# Patient Record
Sex: FEMALE | Race: WHITE | NOT HISPANIC OR LATINO | RURAL
[De-identification: names, ages, dates, MRNs, and addresses within clinical notes are randomized per-mention and may not be internally consistent; named-entity substitution may affect disease eponyms.]

---

## 2019-04-16 ENCOUNTER — OFFICE VISIT (OUTPATIENT)
Dept: RETAIL CLINIC | Facility: CLINIC | Age: 34
End: 2019-04-16

## 2019-04-16 VITALS
SYSTOLIC BLOOD PRESSURE: 134 MMHG | WEIGHT: 134.2 LBS | BODY MASS INDEX: 22.91 KG/M2 | HEART RATE: 111 BPM | HEIGHT: 64 IN | OXYGEN SATURATION: 98 % | DIASTOLIC BLOOD PRESSURE: 84 MMHG | RESPIRATION RATE: 14 BRPM | TEMPERATURE: 97.2 F

## 2019-04-16 DIAGNOSIS — R68.89 FLU-LIKE SYMPTOMS: ICD-10-CM

## 2019-04-16 DIAGNOSIS — J01.40 ACUTE NON-RECURRENT PANSINUSITIS: Primary | ICD-10-CM

## 2019-04-16 LAB
EXPIRATION DATE: NORMAL
FLUAV AG NPH QL: NEGATIVE
FLUBV AG NPH QL: NEGATIVE
INTERNAL CONTROL: NORMAL
Lab: NORMAL

## 2019-04-16 PROCEDURE — 87804 INFLUENZA ASSAY W/OPTIC: CPT | Performed by: NURSE PRACTITIONER

## 2019-04-16 PROCEDURE — 99213 OFFICE O/P EST LOW 20 MIN: CPT | Performed by: NURSE PRACTITIONER

## 2019-04-16 RX ORDER — BENZONATATE 200 MG/1
200 CAPSULE ORAL 3 TIMES DAILY PRN
Qty: 30 CAPSULE | Refills: 0 | Status: SHIPPED | OUTPATIENT
Start: 2019-04-16 | End: 2019-06-15

## 2019-04-16 RX ORDER — PREDNISONE 10 MG/1
TABLET ORAL DAILY
Qty: 21 EACH | Refills: 0 | Status: SHIPPED | OUTPATIENT
Start: 2019-04-16 | End: 2019-04-22

## 2019-04-16 RX ORDER — FLUTICASONE PROPIONATE 50 MCG
2 SPRAY, SUSPENSION (ML) NASAL NIGHTLY
Qty: 1 BOTTLE | Refills: 0 | Status: SHIPPED | OUTPATIENT
Start: 2019-04-16 | End: 2019-05-16

## 2019-04-16 RX ORDER — PSEUDOEPHEDRINE HCL 120 MG/1
120 TABLET, FILM COATED, EXTENDED RELEASE ORAL EVERY 12 HOURS
Qty: 20 TABLET | Refills: 0 | Status: SHIPPED | OUTPATIENT
Start: 2019-04-16 | End: 2019-04-26

## 2019-04-16 RX ORDER — AZITHROMYCIN 250 MG/1
TABLET, FILM COATED ORAL
Qty: 6 TABLET | Refills: 0 | Status: SHIPPED | OUTPATIENT
Start: 2019-04-16 | End: 2019-06-15

## 2019-04-16 NOTE — PROGRESS NOTES
Subjective   Renay Haney is a 34 y.o. female.     Pt states that she feels SOA, nasal congestion, cough, sore throat and body aches for the last two days. PT states that she had similar symptoms one month ago      Cough   This is a new problem. The current episode started in the past 7 days (over 48 hours). The problem has been gradually worsening. The problem occurs constantly. The cough is non-productive. Associated symptoms include chills, headaches, myalgias, nasal congestion, postnasal drip, rhinorrhea, a sore throat, shortness of breath and sweats. Pertinent negatives include no chest pain, ear congestion, ear pain, eye redness, fever, heartburn, hemoptysis, rash, weight loss or wheezing. Nothing aggravates the symptoms. Treatments tried: OTC mucinex. The treatment provided no relief. Her past medical history is significant for environmental allergies. There is no history of asthma, bronchiectasis, bronchitis, COPD, emphysema or pneumonia.   Flu Symptoms   This is a new problem. The current episode started in the past 7 days. The problem occurs constantly. The problem has been gradually worsening. Associated symptoms include chills, congestion, coughing, diaphoresis, fatigue, headaches, myalgias, nausea and a sore throat. Pertinent negatives include no abdominal pain, anorexia, arthralgias, change in bowel habit, chest pain, fever, joint swelling, neck pain, numbness, rash, swollen glands, urinary symptoms, vertigo, visual change, vomiting or weakness. Nothing aggravates the symptoms. She has tried acetaminophen and NSAIDs for the symptoms. The treatment provided no relief.        No current outpatient medications on file prior to visit.     No current facility-administered medications on file prior to visit.        No Known Allergies    Past Medical History:   Diagnosis Date   • Acid reflux    • Allergic    • Anxiety    • Depression    • Migraines    • Stomach ulcer        Past Surgical History:   Procedure  "Laterality Date   • ADENOIDECTOMY     • TONSILLECTOMY         Family History   Problem Relation Age of Onset   • Diabetes Father        Social History     Socioeconomic History   • Marital status: Unknown     Spouse name: Not on file   • Number of children: Not on file   • Years of education: Not on file   • Highest education level: Not on file   Tobacco Use   • Smoking status: Current Every Day Smoker     Packs/day: 0.50     Years: 15.00     Pack years: 7.50   Substance and Sexual Activity   • Alcohol use: No     Frequency: Never   • Drug use: No       Review of Systems   Constitutional: Positive for appetite change, chills, diaphoresis and fatigue. Negative for activity change, fever and weight loss.   HENT: Positive for congestion, postnasal drip, rhinorrhea, sneezing and sore throat. Negative for ear pain, sinus pressure, trouble swallowing and voice change.    Eyes: Positive for discharge. Negative for pain, redness and itching.   Respiratory: Positive for cough and shortness of breath. Negative for hemoptysis and wheezing.    Cardiovascular: Negative for chest pain.   Gastrointestinal: Positive for nausea. Negative for abdominal pain, anorexia, change in bowel habit, diarrhea, heartburn and vomiting.   Musculoskeletal: Positive for myalgias. Negative for arthralgias, joint swelling and neck pain.   Skin: Negative for rash.   Allergic/Immunologic: Positive for environmental allergies.   Neurological: Positive for headaches. Negative for dizziness, vertigo, weakness, light-headedness and numbness.   Psychiatric/Behavioral: Negative for agitation.       /84   Pulse 111   Temp 97.2 °F (36.2 °C) (Oral)   Resp 14   Ht 162.6 cm (64\")   Wt 60.9 kg (134 lb 3.2 oz)   LMP 04/11/2019   SpO2 98%   BMI 23.04 kg/m²     Objective   Physical Exam   Constitutional: She is oriented to person, place, and time. She appears well-developed and well-nourished. She is cooperative. She appears ill.   HENT:   Head: " Normocephalic and atraumatic.   Right Ear: Tympanic membrane, external ear and ear canal normal.   Left Ear: Tympanic membrane, external ear and ear canal normal. There is tenderness.   Nose: Mucosal edema and rhinorrhea present. Right sinus exhibits maxillary sinus tenderness and frontal sinus tenderness. Left sinus exhibits maxillary sinus tenderness and frontal sinus tenderness.   Mouth/Throat: Uvula is midline and mucous membranes are normal. Oropharyngeal exudate (PND) present. No posterior oropharyngeal erythema. Tonsils are 0 on the right. Tonsils are 0 on the left. No tonsillar exudate.   Eyes: Conjunctivae and lids are normal.   Cardiovascular: Normal heart sounds.   Pulmonary/Chest: Effort normal and breath sounds normal.   Abdominal: Soft. Bowel sounds are increased. There is no tenderness.   Lymphadenopathy:     She has cervical adenopathy.   Neurological: She is alert and oriented to person, place, and time.   Skin: Skin is warm and dry. No rash noted.   Psychiatric: She has a normal mood and affect. Her speech is normal and behavior is normal.       Procedures None    Assessment/Plan   Renay was seen today for cough, sore throat and flu symptoms.    Diagnoses and all orders for this visit:    Acute non-recurrent pansinusitis    Flu-like symptoms  -     POC Influenza A / B    Other orders  -     azithromycin (ZITHROMAX Z-KHALIF) 250 MG tablet; Take 2 tablets the first day, then 1 tablet daily for 4 days.  -     benzonatate (TESSALON) 200 MG capsule; Take 1 capsule by mouth 3 (Three) Times a Day As Needed for Cough.  -     pseudoephedrine (SUDAFED) 120 MG 12 hr tablet; Take 1 tablet by mouth Every 12 (Twelve) Hours for 10 days.  -     fluticasone (FLONASE) 50 MCG/ACT nasal spray; 2 sprays into the nostril(s) as directed by provider Every Night for 30 days. Administer 2 sprays in each nostril for each dose.  -     predniSONE (DELTASONE) 10 MG (21) tablet pack; Take  by mouth Daily for 6 days. Use as  directed on package        Results for orders placed or performed in visit on 04/16/19   POC Influenza A / B   Result Value Ref Range    Rapid Influenza A Ag Negative Negative    Rapid Influenza B Ag Negative Negative    Internal Control Passed Passed    Lot Number [id number]     Expiration Date 4,302,021      -[facility] 4/12/19  Follow up with PCP or go to the nearest emergency room if symptoms worsen or fail to improve.

## 2019-04-16 NOTE — PATIENT INSTRUCTIONS

## 2019-05-02 ENCOUNTER — LAB REQUISITION (OUTPATIENT)
Dept: LAB | Facility: HOSPITAL | Age: 34
End: 2019-05-02

## 2019-05-02 DIAGNOSIS — Z00.00 ROUTINE GENERAL MEDICAL EXAMINATION AT A HEALTH CARE FACILITY: ICD-10-CM

## 2019-05-02 PROCEDURE — 36415 COLL VENOUS BLD VENIPUNCTURE: CPT | Performed by: OBSTETRICS & GYNECOLOGY

## 2019-06-15 ENCOUNTER — OFFICE VISIT (OUTPATIENT)
Dept: RETAIL CLINIC | Facility: CLINIC | Age: 34
End: 2019-06-15

## 2019-06-15 VITALS
BODY MASS INDEX: 21.61 KG/M2 | OXYGEN SATURATION: 99 % | HEIGHT: 64 IN | RESPIRATION RATE: 12 BRPM | TEMPERATURE: 97.4 F | WEIGHT: 126.6 LBS | HEART RATE: 90 BPM | SYSTOLIC BLOOD PRESSURE: 118 MMHG | DIASTOLIC BLOOD PRESSURE: 70 MMHG

## 2019-06-15 DIAGNOSIS — J01.41 ACUTE RECURRENT PANSINUSITIS: Primary | ICD-10-CM

## 2019-06-15 DIAGNOSIS — R05.9 COUGHING: ICD-10-CM

## 2019-06-15 PROCEDURE — 99213 OFFICE O/P EST LOW 20 MIN: CPT | Performed by: NURSE PRACTITIONER

## 2019-06-15 RX ORDER — LORATADINE 10 MG/1
10 TABLET ORAL DAILY
COMMUNITY

## 2019-06-15 RX ORDER — AMOXICILLIN AND CLAVULANATE POTASSIUM 875; 125 MG/1; MG/1
1 TABLET, FILM COATED ORAL 2 TIMES DAILY
Qty: 20 TABLET | Refills: 0 | Status: SHIPPED | OUTPATIENT
Start: 2019-06-15 | End: 2019-08-27

## 2019-06-15 RX ORDER — DEXTROMETHORPHAN HYDROBROMIDE AND PROMETHAZINE HYDROCHLORIDE 15; 6.25 MG/5ML; MG/5ML
5 SYRUP ORAL 4 TIMES DAILY PRN
Qty: 240 ML | Refills: 0 | Status: SHIPPED | OUTPATIENT
Start: 2019-06-15 | End: 2019-06-25

## 2019-06-15 RX ORDER — PSEUDOEPHEDRINE HCL 120 MG/1
120 TABLET, FILM COATED, EXTENDED RELEASE ORAL EVERY 12 HOURS
Qty: 20 TABLET | Refills: 0 | Status: SHIPPED | OUTPATIENT
Start: 2019-06-15 | End: 2019-06-25

## 2019-06-15 RX ORDER — PREDNISONE 10 MG/1
TABLET ORAL DAILY
Qty: 21 EACH | Refills: 0 | Status: SHIPPED | OUTPATIENT
Start: 2019-06-15 | End: 2019-06-21

## 2019-06-15 NOTE — PROGRESS NOTES
Subjective   Renay Haney is a 34 y.o. female.     Sinus Problem   This is a recurrent problem. Episode onset: 2-3 weeks. The problem has been gradually worsening since onset. There has been no fever. The pain is severe. Associated symptoms include congestion, coughing (severe, persistent), headaches, a hoarse voice, sinus pressure and swollen glands. Pertinent negatives include no chills, ear pain, neck pain, shortness of breath, sneezing or sore throat. Treatments tried: allergy medication. The treatment provided no relief.   Cough   This is a recurrent problem. The current episode started in the past 7 days. The problem has been gradually worsening. The problem occurs every few minutes. The cough is non-productive. Associated symptoms include ear congestion, headaches, nasal congestion, postnasal drip and rhinorrhea. Pertinent negatives include no chest pain, chills, ear pain, fever, myalgias, rash, sore throat, shortness of breath, weight loss or wheezing. Risk factors for lung disease include smoking/tobacco exposure. She has tried OTC cough suppressant for the symptoms. The treatment provided no relief. Her past medical history is significant for bronchitis. There is no history of asthma or pneumonia.        The following portions of the patient's history were reviewed and updated as appropriate: allergies, current medications, past medical history, past social history, past surgical history and problem list.    Review of Systems   Constitutional: Negative for appetite change, chills, fever and weight loss.   HENT: Positive for congestion, hoarse voice, postnasal drip, rhinorrhea, sinus pressure and sinus pain (severe). Negative for ear pain, sneezing, sore throat and trouble swallowing.    Eyes: Negative.    Respiratory: Positive for cough (severe, persistent) and chest tightness. Negative for shortness of breath and wheezing.    Cardiovascular: Negative.  Negative for chest pain.   Gastrointestinal: Negative  "for abdominal pain, diarrhea, nausea and vomiting.   Musculoskeletal: Negative.  Negative for myalgias and neck pain.   Skin: Negative.  Negative for rash.   Neurological: Positive for headaches. Negative for dizziness.   Hematological: Positive for adenopathy.        /70   Pulse 90   Temp 97.4 °F (36.3 °C)   Resp 12   Ht 162.6 cm (64\")   Wt 57.4 kg (126 lb 9.6 oz)   LMP 05/25/2019   SpO2 99%   BMI 21.73 kg/m²      Objective   Physical Exam   Constitutional: She is oriented to person, place, and time. Vital signs are normal. She appears well-developed and well-nourished. No distress.   HENT:   Head: Normocephalic.   Right Ear: External ear and ear canal normal. No drainage, swelling or tenderness. Tympanic membrane is bulging. Tympanic membrane is not erythematous.   Left Ear: External ear and ear canal normal. No drainage, swelling or tenderness. Tympanic membrane is bulging. Tympanic membrane is not erythematous.   Nose: Mucosal edema and rhinorrhea (thick, yellowish) present. Right sinus exhibits maxillary sinus tenderness (severe) and frontal sinus tenderness. Left sinus exhibits maxillary sinus tenderness (severe) and frontal sinus tenderness.   Mouth/Throat: Uvula is midline, oropharynx is clear and moist and mucous membranes are normal. Tonsils are 0 on the right. Tonsils are 0 on the left. No tonsillar exudate.   Eyes: Conjunctivae are normal. Pupils are equal, round, and reactive to light.   Neck: Normal range of motion. Neck supple.   Cardiovascular: Normal rate, regular rhythm, S1 normal, S2 normal and normal heart sounds.   Pulmonary/Chest: Effort normal and breath sounds normal. No stridor. No respiratory distress. She has no decreased breath sounds. She has no wheezes. She has no rhonchi. She has no rales.   Abdominal: Soft. Bowel sounds are normal. She exhibits no distension. There is no tenderness. There is no rebound and no guarding.   Lymphadenopathy:        Head (right side): " Tonsillar adenopathy present.        Head (left side): Tonsillar adenopathy present.     She has no cervical adenopathy.   Neurological: She is alert and oriented to person, place, and time.   Skin: Skin is warm, dry and intact. No rash noted. She is not diaphoretic.   Psychiatric: She has a normal mood and affect. Her speech is normal and behavior is normal. Thought content normal.   Vitals reviewed.      Assessment/Plan   Renay was seen today for sinus problem and cough.    Diagnoses and all orders for this visit:    Acute recurrent pansinusitis  -     amoxicillin-clavulanate (AUGMENTIN) 875-125 MG per tablet; Take 1 tablet by mouth 2 (Two) Times a Day.  -     pseudoephedrine (SUDAFED) 120 MG 12 hr tablet; Take 1 tablet by mouth Every 12 (Twelve) Hours for 10 days.  -     predniSONE (DELTASONE) 10 MG (21) tablet pack; Take  by mouth Daily for 6 days. Use as directed on package    Coughing  -     predniSONE (DELTASONE) 10 MG (21) tablet pack; Take  by mouth Daily for 6 days. Use as directed on package  -     promethazine-dextromethorphan (PROMETHAZINE-DM) 6.25-15 MG/5ML syrup; Take 5 mL by mouth 4 (Four) Times a Day As Needed for Cough for up to 10 days.

## 2019-08-27 ENCOUNTER — OFFICE VISIT (OUTPATIENT)
Dept: RETAIL CLINIC | Facility: CLINIC | Age: 34
End: 2019-08-27

## 2019-08-27 VITALS
HEART RATE: 147 BPM | RESPIRATION RATE: 12 BRPM | WEIGHT: 133.4 LBS | BODY MASS INDEX: 22.77 KG/M2 | HEIGHT: 64 IN | TEMPERATURE: 99.6 F | OXYGEN SATURATION: 98 %

## 2019-08-27 DIAGNOSIS — J01.00 ACUTE NON-RECURRENT MAXILLARY SINUSITIS: Primary | ICD-10-CM

## 2019-08-27 LAB
EXPIRATION DATE: NORMAL
INTERNAL CONTROL: NORMAL
Lab: NORMAL
S PYO AG THROAT QL: NEGATIVE

## 2019-08-27 PROCEDURE — 99213 OFFICE O/P EST LOW 20 MIN: CPT | Performed by: NURSE PRACTITIONER

## 2019-08-27 PROCEDURE — 87880 STREP A ASSAY W/OPTIC: CPT | Performed by: NURSE PRACTITIONER

## 2019-08-27 RX ORDER — DEXTROMETHORPHAN HYDROBROMIDE AND PROMETHAZINE HYDROCHLORIDE 15; 6.25 MG/5ML; MG/5ML
5 SYRUP ORAL 4 TIMES DAILY PRN
Qty: 120 ML | Refills: 0 | Status: SHIPPED | OUTPATIENT
Start: 2019-08-27 | End: 2019-09-01

## 2019-08-27 RX ORDER — AMOXICILLIN AND CLAVULANATE POTASSIUM 875; 125 MG/1; MG/1
1 TABLET, FILM COATED ORAL 2 TIMES DAILY
Qty: 14 TABLET | Refills: 0 | Status: SHIPPED | OUTPATIENT
Start: 2019-08-27 | End: 2019-09-03

## 2019-08-27 RX ORDER — PRENATAL VIT NO.126/IRON/FOLIC 28MG-0.8MG
TABLET ORAL
Refills: 11 | COMMUNITY
Start: 2019-07-19

## 2019-08-27 NOTE — PROGRESS NOTES
"Subjective   Renay Haney is a 34 y.o. female.   Pulse (!) 147   Temp 99.6 °F (37.6 °C)   Resp 12   Ht 162.6 cm (64\")   Wt 60.5 kg (133 lb 6.4 oz)   LMP 08/27/2019   SpO2 98%   BMI 22.90 kg/m²   Past Medical History:   Diagnosis Date   • Acid reflux    • Allergic    • Anxiety    • Depression    • Migraines    • Stomach ulcer      No Known Allergies      URI    This is a new problem. The current episode started in the past 7 days. The problem has been gradually worsening. Associated symptoms include congestion, coughing, rhinorrhea, sinus pain, a sore throat and wheezing. Pertinent negatives include no sneezing, swollen glands or vomiting.        The following portions of the patient's history were reviewed and updated as appropriate: allergies, current medications, past family history, past medical history, past social history, past surgical history and problem list.    Review of Systems   Constitutional: Positive for activity change, appetite change and fatigue.   HENT: Positive for congestion, postnasal drip, rhinorrhea, sinus pain and sore throat. Negative for sneezing.    Eyes: Negative.    Respiratory: Positive for cough, shortness of breath and wheezing. Negative for choking, chest tightness and stridor.    Cardiovascular: Negative.    Gastrointestinal: Negative.  Negative for vomiting.       Objective   Physical Exam   Constitutional:  Non-toxic appearance. She appears ill.   HENT:   Right Ear: Tympanic membrane and ear canal normal.   Left Ear: Tympanic membrane and ear canal normal.   Nose: Mucosal edema and rhinorrhea present. Right sinus exhibits maxillary sinus tenderness. Right sinus exhibits no frontal sinus tenderness. Left sinus exhibits maxillary sinus tenderness. Left sinus exhibits no frontal sinus tenderness.   Mouth/Throat: Uvula is midline. Posterior oropharyngeal erythema (mild) present.       Assessment/Plan   Renay was seen today for allergic rhinitis, cough and sore " throat.    Diagnoses and all orders for this visit:    Acute non-recurrent maxillary sinusitis  -     POC Rapid Strep A    Other orders  -     amoxicillin-clavulanate (AUGMENTIN) 875-125 MG per tablet; Take 1 tablet by mouth 2 (Two) Times a Day for 7 days.  -     promethazine-dextromethorphan (PROMETHAZINE-DM) 6.25-15 MG/5ML syrup; Take 5 mL by mouth 4 (Four) Times a Day As Needed for Cough for up to 5 days.        Results for orders placed or performed in visit on 08/27/19   POC Rapid Strep A   Result Value Ref Range    Rapid Strep A Screen Negative Negative, VALID, INVALID, Not Performed    Internal Control Passed Passed    Lot Number [id number]     Expiration Date 10/31/2020

## 2019-09-08 ENCOUNTER — OFFICE VISIT (OUTPATIENT)
Dept: RETAIL CLINIC | Facility: CLINIC | Age: 34
End: 2019-09-08

## 2019-09-08 VITALS
OXYGEN SATURATION: 98 % | RESPIRATION RATE: 14 BRPM | SYSTOLIC BLOOD PRESSURE: 110 MMHG | WEIGHT: 132.8 LBS | HEIGHT: 64 IN | HEART RATE: 94 BPM | BODY MASS INDEX: 22.67 KG/M2 | DIASTOLIC BLOOD PRESSURE: 80 MMHG | TEMPERATURE: 97 F

## 2019-09-08 DIAGNOSIS — R05.9 COUGHING: Primary | ICD-10-CM

## 2019-09-08 DIAGNOSIS — R09.81 SINUS CONGESTION: ICD-10-CM

## 2019-09-08 PROCEDURE — 99213 OFFICE O/P EST LOW 20 MIN: CPT | Performed by: NURSE PRACTITIONER

## 2019-09-08 RX ORDER — BENZONATATE 200 MG/1
200 CAPSULE ORAL 3 TIMES DAILY PRN
Qty: 30 CAPSULE | Refills: 0 | Status: SHIPPED | OUTPATIENT
Start: 2019-09-08 | End: 2019-09-18

## 2019-09-08 RX ORDER — PREDNISONE 10 MG/1
TABLET ORAL DAILY
Qty: 21 EACH | Refills: 0 | Status: SHIPPED | OUTPATIENT
Start: 2019-09-08 | End: 2019-09-14

## 2019-09-08 RX ORDER — PSEUDOEPHEDRINE HCL 120 MG/1
120 TABLET, FILM COATED, EXTENDED RELEASE ORAL EVERY 12 HOURS
Qty: 20 TABLET | Refills: 0 | Status: SHIPPED | OUTPATIENT
Start: 2019-09-08 | End: 2019-09-18

## 2019-09-08 RX ORDER — DEXTROMETHORPHAN HYDROBROMIDE AND PROMETHAZINE HYDROCHLORIDE 15; 6.25 MG/5ML; MG/5ML
5 SYRUP ORAL 4 TIMES DAILY PRN
Qty: 240 ML | Refills: 0 | Status: SHIPPED | OUTPATIENT
Start: 2019-09-08 | End: 2019-09-18

## 2019-09-08 NOTE — PROGRESS NOTES
"Subjective   Renay Haney is a 34 y.o. female.     Cough   This is a recurrent problem. Episode onset: 2 weeks. The problem has been gradually worsening. The problem occurs every few minutes. The cough is non-productive. Associated symptoms include chills, nasal congestion and wheezing. Pertinent negatives include no chest pain, ear congestion, ear pain, fever, headaches, myalgias, postnasal drip, rash, rhinorrhea, sore throat, shortness of breath, sweats or weight loss. Risk factors for lung disease include smoking/tobacco exposure. She has tried OTC cough suppressant for the symptoms. The treatment provided no relief. Her past medical history is significant for bronchitis. There is no history of asthma or pneumonia.        The following portions of the patient's history were reviewed and updated as appropriate: allergies, current medications, past medical history, past social history, past surgical history and problem list.    Review of Systems   Constitutional: Positive for chills and fatigue. Negative for appetite change, fever and weight loss.   HENT: Positive for congestion. Negative for ear pain, postnasal drip, rhinorrhea, sore throat and trouble swallowing.    Eyes: Negative.    Respiratory: Positive for cough (severe, persistent), chest tightness and wheezing. Negative for shortness of breath.    Cardiovascular: Negative.  Negative for chest pain.   Gastrointestinal: Negative for abdominal pain, diarrhea, nausea and vomiting.   Musculoskeletal: Negative.  Negative for myalgias.   Skin: Negative.  Negative for rash.   Neurological: Negative for dizziness and headaches.   Hematological: Negative for adenopathy.        /80   Pulse 94   Temp 97 °F (36.1 °C)   Resp 14   Ht 162.6 cm (64\")   Wt 60.2 kg (132 lb 12.8 oz)   LMP 08/27/2019   SpO2 98%   BMI 22.80 kg/m²      Objective   Physical Exam   Constitutional: She is oriented to person, place, and time. Vital signs are normal. She appears " well-developed and well-nourished. No distress.   HENT:   Head: Normocephalic.   Right Ear: Tympanic membrane, external ear and ear canal normal. No drainage, swelling or tenderness. Tympanic membrane is not erythematous and not bulging.   Left Ear: Tympanic membrane, external ear and ear canal normal. No drainage, swelling or tenderness. Tympanic membrane is not erythematous and not bulging.   Nose: Mucosal edema and rhinorrhea present. Right sinus exhibits no maxillary sinus tenderness and no frontal sinus tenderness. Left sinus exhibits no maxillary sinus tenderness and no frontal sinus tenderness.   Mouth/Throat: Uvula is midline, oropharynx is clear and moist and mucous membranes are normal. Tonsils are 0 on the right. Tonsils are 0 on the left. No tonsillar exudate.   Neck: Normal range of motion. Neck supple.   Cardiovascular: Normal rate, regular rhythm, S1 normal, S2 normal and normal heart sounds.   Pulmonary/Chest: Effort normal. No stridor. No respiratory distress. She has no decreased breath sounds. She has wheezes (scattered) in the right upper field and the left upper field. She has no rhonchi. She has no rales.   Abdominal: Soft. Bowel sounds are normal. She exhibits no distension. There is no tenderness. There is no rebound and no guarding.   Lymphadenopathy:        Head (right side): No tonsillar adenopathy present.        Head (left side): No tonsillar adenopathy present.     She has no cervical adenopathy.   Neurological: She is alert and oriented to person, place, and time.   Skin: Skin is warm and dry. No rash noted. She is not diaphoretic.   Psychiatric: She has a normal mood and affect. Her speech is normal and behavior is normal. Thought content normal.   Vitals reviewed.      Assessment/Plan   Renay was seen today for cough.    Diagnoses and all orders for this visit:    Coughing  -     benzonatate (TESSALON) 200 MG capsule; Take 1 capsule by mouth 3 (Three) Times a Day As Needed for Cough  for up to 10 days.  -     predniSONE (DELTASONE) 10 MG (21) tablet pack; Take  by mouth Daily for 6 days. Use as directed on package  -     promethazine-dextromethorphan (PROMETHAZINE-DM) 6.25-15 MG/5ML syrup; Take 5 mL by mouth 4 (Four) Times a Day As Needed for Cough for up to 10 days.    Sinus congestion  -     pseudoephedrine (SUDAFED) 120 MG 12 hr tablet; Take 1 tablet by mouth Every 12 (Twelve) Hours for 10 days.

## 2020-01-27 ENCOUNTER — HOSPITAL ENCOUNTER (OUTPATIENT)
Age: 35
End: 2020-01-27
Payer: COMMERCIAL

## 2020-01-27 DIAGNOSIS — D23.22: ICD-10-CM

## 2020-01-27 DIAGNOSIS — Z01.818: Primary | ICD-10-CM

## 2020-01-27 LAB
ANION GAP SERPL CALC-SCNC: 14.3 MEQ/L (ref 5–15)
BUN SERPL-MCNC: 4 MG/DL (ref 7–18)
CALCIUM SPEC-MCNC: 8.5 MG/DL (ref 8.5–10.1)
CHLORIDE SPEC-SCNC: 108 MMOL/L (ref 98–107)
CO2 SERPL-SCNC: 29 MMOL/L (ref 21–32)
CREAT BLD-SCNC: 0.77 MG/DL (ref 0.55–1.02)
ESTIMATED GLOMERULAR FILT RATE: 86 ML/MIN (ref 60–?)
GFR (AFRICAN AMERICAN): 104 ML/MIN (ref 60–?)
GLUCOSE: 83 MG/DL (ref 74–106)
HCT VFR BLD CALC: 39.8 % (ref 37–47)
HGB BLD-MCNC: 13.2 G/DL (ref 12.2–16.2)
MCHC RBC-ENTMCNC: 33.2 G/DL (ref 31.8–35.4)
MCV RBC: 90.3 FL (ref 81–99)
MEAN CORPUSCULAR HEMOGLOBIN: 30 PG (ref 27–31.2)
PLATELET # BLD: 270 K/MM3 (ref 142–424)
POTASSIUM: 3.3 MMOL/L (ref 3.5–5.1)
RBC # BLD AUTO: 4.4 M/MM3 (ref 4.2–5.4)
SODIUM SPEC-SCNC: 148 MMOL/L (ref 136–145)
WBC # BLD AUTO: 8.1 K/MM3 (ref 4.8–10.8)

## 2020-01-27 PROCEDURE — 80048 BASIC METABOLIC PNL TOTAL CA: CPT

## 2020-01-27 PROCEDURE — 85025 COMPLETE CBC W/AUTO DIFF WBC: CPT

## 2020-01-27 PROCEDURE — 81025 URINE PREGNANCY TEST: CPT

## 2020-01-27 PROCEDURE — 36415 COLL VENOUS BLD VENIPUNCTURE: CPT

## 2021-04-29 ENCOUNTER — TELEPHONE (OUTPATIENT)
Dept: OBSTETRICS AND GYNECOLOGY | Facility: CLINIC | Age: 36
End: 2021-04-29